# Patient Record
Sex: FEMALE | Race: WHITE | NOT HISPANIC OR LATINO | Employment: STUDENT | URBAN - METROPOLITAN AREA
[De-identification: names, ages, dates, MRNs, and addresses within clinical notes are randomized per-mention and may not be internally consistent; named-entity substitution may affect disease eponyms.]

---
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
## 2024-08-18 ENCOUNTER — NURSE TRIAGE (OUTPATIENT)
Dept: NURSING | Facility: CLINIC | Age: 19
End: 2024-08-18
Payer: COMMERCIAL

## 2024-08-18 NOTE — TELEPHONE ENCOUNTER
Nurse Triage SBAR    Is this a 2nd Level Triage? NO    Situation: Pharyngitis exposure    Background: Patient just found out that her friend has pharyngitis and is wondering if she needs to start antibiotics since she has hung out with him a lot lately.     Assessment: Patient states that her friend has viral pharyngitis. Patient does not current have any symptoms of pharyngitis but just wanted to make sure she did not need to be treated. Denies sore throat, fever, difficulty swallowing or decreased appetite.     Protocol Recommended Disposition:   Home Care    Recommendation: Patient advised to monitor for symptoms of pharyngitis and be seen or call back if they develop, otherwise it is not necessary to be seen and no treatment is needed at this time.        Reason for Disposition   [1] Strep throat EXPOSURE AND [2] no symptoms (e.g., sore throat)    Additional Information   Negative: Sounds like a life-threatening emergency to the triager   Negative: [1] Drooling or spitting out saliva (because can't swallow) AND [2] new-onset   Negative: [1] Difficulty breathing AND [2] not severe   Negative: Fever > 104 F (40 C)   Negative: [1] Drinking very little AND [2] dehydration suspected (e.g., no urine > 12 hours, very dry mouth, very lightheaded)   Negative: [1] Refuses to drink anything AND [2] for > 12 hours   Negative: Patient sounds very sick or weak to the triager   Negative: SEVERE (e.g., excruciating) throat pain   Negative: [1] Positive throat culture or rapid strep test (according to lab, PCP, caller, etc.) AND [2] NO standing order to call in prescription for antibiotic   Negative: [1] Strep throat EXPOSURE within past 10 days AND [2] sore throat   Negative: [1] Positive throat culture or rapid strep test (according to lab, PCP, caller, etc.) AND [2] standing order to call in prescription for antibiotic    Protocols used: Strep Throat Exposure-A-

## 2024-11-01 ENCOUNTER — TRANSFERRED RECORDS (OUTPATIENT)
Dept: MULTI SPECIALTY CLINIC | Facility: CLINIC | Age: 19
End: 2024-11-01

## 2024-11-01 LAB — CHLAMYDIA - HIM PATIENT REPORTED: NORMAL

## 2025-02-13 ENCOUNTER — OFFICE VISIT (OUTPATIENT)
Dept: FAMILY MEDICINE | Facility: CLINIC | Age: 20
End: 2025-02-13
Payer: COMMERCIAL

## 2025-02-13 VITALS
TEMPERATURE: 97.8 F | HEART RATE: 79 BPM | DIASTOLIC BLOOD PRESSURE: 72 MMHG | HEIGHT: 66 IN | WEIGHT: 231.6 LBS | SYSTOLIC BLOOD PRESSURE: 105 MMHG | BODY MASS INDEX: 37.22 KG/M2 | RESPIRATION RATE: 20 BRPM | OXYGEN SATURATION: 96 %

## 2025-02-13 DIAGNOSIS — J01.00 ACUTE NON-RECURRENT MAXILLARY SINUSITIS: Primary | ICD-10-CM

## 2025-02-13 RX ORDER — CEFDINIR 300 MG/1
300 CAPSULE ORAL 2 TIMES DAILY
Qty: 20 CAPSULE | Refills: 0 | Status: SHIPPED | OUTPATIENT
Start: 2025-02-13

## 2025-02-13 RX ORDER — LORATADINE 10 MG/1
1 TABLET ORAL DAILY
COMMUNITY
Start: 2024-07-10

## 2025-02-13 RX ORDER — FLUTICASONE PROPIONATE 50 MCG
2 SPRAY, SUSPENSION (ML) NASAL DAILY
COMMUNITY
Start: 2024-07-10

## 2025-02-13 ASSESSMENT — ENCOUNTER SYMPTOMS: COUGH: 1

## 2025-02-13 ASSESSMENT — PATIENT HEALTH QUESTIONNAIRE - PHQ9
SUM OF ALL RESPONSES TO PHQ QUESTIONS 1-9: 15
SUM OF ALL RESPONSES TO PHQ QUESTIONS 1-9: 15
10. IF YOU CHECKED OFF ANY PROBLEMS, HOW DIFFICULT HAVE THESE PROBLEMS MADE IT FOR YOU TO DO YOUR WORK, TAKE CARE OF THINGS AT HOME, OR GET ALONG WITH OTHER PEOPLE: VERY DIFFICULT

## 2025-02-13 NOTE — PROGRESS NOTES
"  Assessment & Plan     Acute non-recurrent maxillary sinusitis  Analgesics and antipyretics as needed, symptomatic care, follow up if not improving   - cefdinir (OMNICEF) 300 MG capsule; Take 1 capsule (300 mg) by mouth 2 times daily.          BMI  Estimated body mass index is 37.1 kg/m  as calculated from the following:    Height as of this encounter: 1.683 m (5' 6.25\").    Weight as of this encounter: 105.1 kg (231 lb 9.6 oz).       Depression Screening Follow Up        2/13/2025    11:44 AM   PHQ   PHQ-9 Total Score 15    Q9: Thoughts of better off dead/self-harm past 2 weeks Several days   F/U: Thoughts of suicide or self-harm No   F/U: Safety concerns No       Patient-reported                     Follow Up Actions Taken      Discussed the following ways the patient can remain in a safe environment:          Cain Quintana is a 19 year old, presenting for the following health issues:  Cough (C/o cough x 2 weeks--no relief with OTC sinus relief or Claritin.    Does have nasal congestion, post nasal drainage)      2/13/2025     1:54 PM   Additional Questions   Roomed by Brigida OLMSTEAD CMA   Accompanied by Self     Via the Health Maintenance questionnaire, the patient has reported the following services have been completed -Chlamydia: [facility] 2024-11-01, this information has been sent to the abstraction team.  Cough    History of Present Illness       Reason for visit:  Lingering cough  Symptom onset:  1-2 weeks ago  Symptom intensity:  Moderate  Symptom progression:  Worsening  Had these symptoms before:  No She is missing 1 dose(s) of medications per week.  She is not taking prescribed medications regularly due to remembering to take.     Reports cough is worse at night, sinus congestion, history of sinusitis in last 6-8 months                  Objective    /72 (BP Location: Right arm, Patient Position: Sitting, Cuff Size: Adult Large)   Pulse 79   Temp 97.8  F (36.6  C) (Tympanic)   " "Resp 20   Ht 1.683 m (5' 6.25\")   Wt 105.1 kg (231 lb 9.6 oz)   SpO2 96%   BMI 37.10 kg/m    Body mass index is 37.1 kg/m .  Physical Exam   PE uri/sinus     General:  Alert and oriented, No acute distress.     Eye:  Normal conjunctiva.     HENT:  Normocephalic, Tympanic membranes are clear, Oral mucosa is moist, No pharyngeal erythema.          Sinus: bilateral maxillary tenderness        Throat: Pharynx (posterior drainage).     Neck:  Supple, Non-tender, No lymphadenopathy.     Respiratory:  Lungs are clear to auscultation; Respirations are non-labored.     Cardiovascular:  Normal rate, Regular rhythm.     Integumentary:  Warm, Dry, No rash  Psychiatric:  Cooperative, Appropriate mood & affect.              Signed Electronically by: Demetrio Miller MD    "

## 2025-03-04 ENCOUNTER — OFFICE VISIT (OUTPATIENT)
Dept: FAMILY MEDICINE | Facility: CLINIC | Age: 20
End: 2025-03-04
Payer: COMMERCIAL

## 2025-03-04 VITALS
SYSTOLIC BLOOD PRESSURE: 122 MMHG | WEIGHT: 232.3 LBS | HEART RATE: 94 BPM | RESPIRATION RATE: 18 BRPM | DIASTOLIC BLOOD PRESSURE: 70 MMHG | HEIGHT: 66 IN | OXYGEN SATURATION: 98 % | BODY MASS INDEX: 37.33 KG/M2 | TEMPERATURE: 97.4 F

## 2025-03-04 DIAGNOSIS — J20.9 ACUTE BRONCHITIS, UNSPECIFIED ORGANISM: Primary | ICD-10-CM

## 2025-03-04 DIAGNOSIS — R05.2 SUBACUTE COUGH: ICD-10-CM

## 2025-03-04 DIAGNOSIS — N64.59 BREAST SYMPTOM: ICD-10-CM

## 2025-03-04 PROCEDURE — 99213 OFFICE O/P EST LOW 20 MIN: CPT | Performed by: NURSE PRACTITIONER

## 2025-03-04 RX ORDER — FLUOXETINE HYDROCHLORIDE 40 MG/1
40 CAPSULE ORAL
COMMUNITY
Start: 2025-02-18

## 2025-03-04 RX ORDER — BENZONATATE 100 MG/1
100 CAPSULE ORAL 3 TIMES DAILY PRN
Qty: 21 CAPSULE | Refills: 0 | Status: SHIPPED | OUTPATIENT
Start: 2025-03-04 | End: 2025-03-11

## 2025-03-04 RX ORDER — AZITHROMYCIN 250 MG/1
TABLET, FILM COATED ORAL
Qty: 6 TABLET | Refills: 0 | Status: SHIPPED | OUTPATIENT
Start: 2025-03-04 | End: 2025-03-09

## 2025-03-04 ASSESSMENT — ENCOUNTER SYMPTOMS: COUGH: 1

## 2025-03-04 NOTE — PROGRESS NOTES
"  Assessment & Plan     Acute bronchitis, unspecified organism  Over 4-week history of cough with posttussive emesis, did not improve with cefdinir.  Will treat with azithromycin for possible pertussis.  Will also cover for mycoplasma pneumonia although lung sounds are clear.  She is up-to-date on Tdap but due for booster in the fall, has waning immunity.  We did discuss pertussis testing but PCR test is not indicated at this time due to duration of symptoms.  Offered serology testing but she declines.  She can continue with DayQuil and NyQuil for symptoms.  Tessalon Perles prescribed for cough.  Follow-up if symptoms not improving with antibiotic.  Could consider adding on albuterol inhaler for cough and chest x-ray.  - benzonatate (TESSALON) 100 MG capsule; Take 1 capsule (100 mg) by mouth 3 times daily as needed for cough.    Subacute cough  - azithromycin (ZITHROMAX) 250 MG tablet; Take 2 tablets (500 mg) by mouth daily for 1 day, THEN 1 tablet (250 mg) daily for 4 days.  - benzonatate (TESSALON) 100 MG capsule; Take 1 capsule (100 mg) by mouth 3 times daily as needed for cough.    Breast symptom  She felt a lump of the left breast.  Clinical exam revealed fibrous tissue or left outer breast,  likely fibrocystic breast tissue.  Recommend she continue to monitor and follow-up if lump increases in size or she has new or concerning symptoms.  Family history of breast cancer in her grandmother.    BMI  Estimated body mass index is 37.21 kg/m  as calculated from the following:    Height as of this encounter: 1.683 m (5' 6.25\").    Weight as of this encounter: 105.4 kg (232 lb 4.8 oz).             Cain Quintana is a 19 year old, presenting for the following health issues:  Cough (Ongoing dry cough x 1 month, turned into productive cough yesterday, pt was treated with abx for this already)        3/4/2025     4:49 PM   Additional Questions   Roomed by DAKOTA Good     Lilian is a 19-year-old female who presents today " "for evaluation of persistent cough onset over 1 month ago.  Initially symptoms started with occasional cough but with sudden onset, turned into a severe daily cough.  Did have runny nose and ticklish throat.  Treated for sinus infection with cefdinir on 2/13/2025.  She does not feel cefdinir was helpful.  DayQuil for symptom management seems to be helpful.  She has not tested for COVID or flu.  No known exposures.  States family and boyfriend have not been sick.  No fever or chills.  No shortness of breath.  Does have occasional posttussive emesis.  Up-to-date on Tdap but due for booster in the fall.    She would also like to discuss symptoms of left breast lump.  Family history of breast cancer in her grandmother.    History of Present Illness       Reason for visit:  Worsening cough    She eats 4 or more servings of fruits and vegetables daily.She consumes 1 sweetened beverage(s) daily.She exercises with enough effort to increase her heart rate 9 or less minutes per day.  She exercises with enough effort to increase her heart rate 3 or less days per week. She is missing 1 dose(s) of medications per week.                  Review of Systems  Constitutional, neuro, ENT, pulmonary, cardiac, breast, musculoskeletal, integument systems are negative, except as otherwise noted.      Objective    /70 (BP Location: Right arm, Patient Position: Sitting, Cuff Size: Adult Large)   Pulse 94   Temp 97.4  F (36.3  C) (Tympanic)   Resp 18   Ht 1.683 m (5' 6.25\")   Wt 105.4 kg (232 lb 4.8 oz)   SpO2 98%   BMI 37.21 kg/m    Body mass index is 37.21 kg/m .  Physical Exam  Chaperone present: deferred chaperone.   Constitutional:       Appearance: Normal appearance.   HENT:      Head: Normocephalic and atraumatic.      Right Ear: Tympanic membrane normal.      Left Ear: Tympanic membrane normal.      Nose: Nose normal.      Mouth/Throat:      Mouth: Mucous membranes are moist.      Pharynx: Oropharynx is clear.   Eyes:     "  Pupils: Pupils are equal, round, and reactive to light.   Cardiovascular:      Rate and Rhythm: Normal rate and regular rhythm.   Pulmonary:      Effort: Pulmonary effort is normal. No respiratory distress.      Breath sounds: Normal breath sounds. No wheezing, rhonchi or rales.      Comments: Cough present  Chest:   Breasts:     Right: Normal. No inverted nipple, nipple discharge, skin change or tenderness.      Left: Normal. No inverted nipple, nipple discharge, skin change or tenderness.      Comments: Fibrocystic, rubbery area of left out breast.   Musculoskeletal:      Cervical back: Neck supple. No tenderness.   Lymphadenopathy:      Cervical: No cervical adenopathy.   Skin:     General: Skin is warm and dry.   Neurological:      Mental Status: She is alert and oriented to person, place, and time.   Psychiatric:         Mood and Affect: Mood normal.         Behavior: Behavior normal.                    Signed Electronically by: ESTELA Summers CNP

## 2025-03-05 ENCOUNTER — TELEPHONE (OUTPATIENT)
Dept: FAMILY MEDICINE | Facility: CLINIC | Age: 20
End: 2025-03-05

## 2025-03-05 DIAGNOSIS — J20.9 ACUTE BRONCHITIS, UNSPECIFIED ORGANISM: Primary | ICD-10-CM

## 2025-03-05 NOTE — TELEPHONE ENCOUNTER
"Medication Pending   Patient was unable to  one of the medications prescribed on office visit from 03/04/2025 with Melani Olvera APRN CNP. Status states \"waiting for payer response\" with a pending prior authorization. Please connect with patient if another medication may be recommended or to wait for insurance to respond.  Medication:     benzonatate (TESSALON) 100 MG capsule   Pharmacy: [place] [id number] [facility].      "

## 2025-03-06 RX ORDER — ALBUTEROL SULFATE 90 UG/1
2 INHALANT RESPIRATORY (INHALATION) EVERY 6 HOURS PRN
Qty: 18 G | Refills: 0 | Status: SHIPPED | OUTPATIENT
Start: 2025-03-06

## 2025-03-06 NOTE — TELEPHONE ENCOUNTER
benzonatate (TESSALON) 100 MG capsule not preferred/covered by insurance, patient inquiring about a secondary prescription for symptoms.

## 2025-03-06 NOTE — TELEPHONE ENCOUNTER
Please let patient know that I sent albuterol inhaler prescription for cough since tessalon perles were not covered.

## 2025-06-10 ENCOUNTER — PATIENT OUTREACH (OUTPATIENT)
Dept: CARE COORDINATION | Facility: CLINIC | Age: 20
End: 2025-06-10

## 2025-06-10 ENCOUNTER — OFFICE VISIT (OUTPATIENT)
Dept: FAMILY MEDICINE | Facility: CLINIC | Age: 20
End: 2025-06-10
Payer: COMMERCIAL

## 2025-06-10 VITALS
BODY MASS INDEX: 38.25 KG/M2 | DIASTOLIC BLOOD PRESSURE: 75 MMHG | HEART RATE: 64 BPM | WEIGHT: 238 LBS | HEIGHT: 66 IN | RESPIRATION RATE: 16 BRPM | SYSTOLIC BLOOD PRESSURE: 112 MMHG | TEMPERATURE: 98 F | OXYGEN SATURATION: 97 %

## 2025-06-10 DIAGNOSIS — H66.001 NON-RECURRENT ACUTE SUPPURATIVE OTITIS MEDIA OF RIGHT EAR WITHOUT SPONTANEOUS RUPTURE OF TYMPANIC MEMBRANE: Primary | ICD-10-CM

## 2025-06-10 PROBLEM — F33.9 MAJOR DEPRESSION, RECURRENT: Status: ACTIVE | Noted: 2020-12-18

## 2025-06-10 PROBLEM — F41.1 GAD (GENERALIZED ANXIETY DISORDER): Status: ACTIVE | Noted: 2020-12-18

## 2025-06-10 PROCEDURE — 3074F SYST BP LT 130 MM HG: CPT | Performed by: NURSE PRACTITIONER

## 2025-06-10 PROCEDURE — 99213 OFFICE O/P EST LOW 20 MIN: CPT | Performed by: NURSE PRACTITIONER

## 2025-06-10 PROCEDURE — 3078F DIAST BP <80 MM HG: CPT | Performed by: NURSE PRACTITIONER

## 2025-06-10 RX ORDER — CEFDINIR 300 MG/1
300 CAPSULE ORAL 2 TIMES DAILY
Qty: 14 CAPSULE | Refills: 0 | Status: SHIPPED | OUTPATIENT
Start: 2025-06-10 | End: 2025-06-17

## 2025-06-10 ASSESSMENT — ENCOUNTER SYMPTOMS
SINUS PAIN: 0
RESPIRATORY NEGATIVE: 1
GASTROINTESTINAL NEGATIVE: 1
SINUS PRESSURE: 0
CARDIOVASCULAR NEGATIVE: 1
EYES NEGATIVE: 1
CONSTITUTIONAL NEGATIVE: 1
SORE THROAT: 0

## 2025-06-10 NOTE — PROGRESS NOTES
"  Assessment & Plan     Non-recurrent acute suppurative otitis media of right ear without spontaneous rupture of tympanic membrane  Slightly erythematous and bulging right TM, provided delayed antibiotic.  Encouraged she wait 1 to 2 days and see if symptoms improve.  If not improved, she can start cefdinir.  She has tolerated well in the past.  Does have amoxicillin allergy.  Follow-up 72 hours after starting antibiotic if not better.  - cefdinir (OMNICEF) 300 MG capsule; Take 1 capsule (300 mg) by mouth 2 times daily for 7 days.          BMI  Estimated body mass index is 38.12 kg/m  as calculated from the following:    Height as of this encounter: 1.683 m (5' 6.25\").    Weight as of this encounter: 108 kg (238 lb).             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  Has history of ear infections.  Symptoms started 2 days ago.  Denies any hearing loss, dizziness or lightheadedness.  No sinus pain, pressure. No fever or chills.  Symptoms affecting sleep.     History of Present Illness       Reason for visit:  Possible ear infection  Symptom onset:  1-3 days ago  Symptoms include:  Inner ear pain, excess ear wax (also, I get ear infections around this time every year)  Symptom intensity:  Mild  Symptom progression:  Staying the same  Had these symptoms before:  Yes  Has tried/received treatment for these symptoms:  Yes  Previous treatment was successful:  Yes  Prior treatment description:  Antibiotics  What makes it worse:  No  What makes it better:  Antibiotics and a hot compress She is missing 2 dose(s) of medications per week.  She is not taking prescribed medications regularly due to remembering to take.                  Review of Systems   Constitutional: " "Negative.    HENT:  Positive for ear pain. Negative for congestion, ear discharge, hearing loss, sinus pressure, sinus pain and sore throat.    Eyes: Negative.    Respiratory: Negative.     Cardiovascular: Negative.    Gastrointestinal: Negative.            Objective    /75 (BP Location: Right arm, Patient Position: Sitting, Cuff Size: Adult Large)   Pulse 64   Temp 98  F (36.7  C) (Tympanic)   Resp 16   Ht 1.683 m (5' 6.25\")   Wt 108 kg (238 lb)   SpO2 97%   BMI 38.12 kg/m    Body mass index is 38.12 kg/m .  Physical Exam  Constitutional:       General: She is not in acute distress.     Appearance: Normal appearance. She is not ill-appearing.   HENT:      Head: Normocephalic and atraumatic.      Right Ear: Ear canal and external ear normal. Tenderness (preauricular and with ear manipulation) present. There is no impacted cerumen. Tympanic membrane is erythematous (slight) and bulging (slight). Tympanic membrane is not perforated.      Left Ear: Tympanic membrane, ear canal and external ear normal.   Cardiovascular:      Rate and Rhythm: Normal rate and regular rhythm.   Pulmonary:      Effort: Pulmonary effort is normal.      Breath sounds: Normal breath sounds.   Neurological:      Mental Status: She is alert.                    Signed Electronically by: ESTELA Summers CNP    "

## 2025-08-10 ENCOUNTER — HEALTH MAINTENANCE LETTER (OUTPATIENT)
Age: 20
End: 2025-08-10